# Patient Record
Sex: FEMALE | Race: WHITE
[De-identification: names, ages, dates, MRNs, and addresses within clinical notes are randomized per-mention and may not be internally consistent; named-entity substitution may affect disease eponyms.]

---

## 2018-05-25 ENCOUNTER — HOSPITAL ENCOUNTER (EMERGENCY)
Dept: HOSPITAL 68 - ERH | Age: 73
End: 2018-05-25
Payer: COMMERCIAL

## 2018-05-25 VITALS — DIASTOLIC BLOOD PRESSURE: 74 MMHG | SYSTOLIC BLOOD PRESSURE: 136 MMHG

## 2018-05-25 VITALS — BODY MASS INDEX: 29.83 KG/M2 | HEIGHT: 61 IN | WEIGHT: 158 LBS

## 2018-05-25 DIAGNOSIS — R11.2: Primary | ICD-10-CM

## 2018-05-25 LAB
ABSOLUTE GRANULOCYTE CT: 4.7 /CUMM (ref 1.4–6.5)
BASOPHILS # BLD: 0 /CUMM (ref 0–0.2)
BASOPHILS NFR BLD: 0.5 % (ref 0–2)
EOSINOPHIL # BLD: 0.1 /CUMM (ref 0–0.7)
EOSINOPHIL NFR BLD: 1.1 % (ref 0–5)
ERYTHROCYTE [DISTWIDTH] IN BLOOD BY AUTOMATED COUNT: 13.8 % (ref 11.5–14.5)
GRANULOCYTES NFR BLD: 71.1 % (ref 42.2–75.2)
HCT VFR BLD CALC: 43.9 % (ref 37–47)
LYMPHOCYTES # BLD: 1.4 /CUMM (ref 1.2–3.4)
MCH RBC QN AUTO: 27.1 PG (ref 27–31)
MCHC RBC AUTO-ENTMCNC: 33.5 G/DL (ref 33–37)
MCV RBC AUTO: 80.8 FL (ref 81–99)
MONOCYTES # BLD: 0.4 /CUMM (ref 0.1–0.6)
PLATELET # BLD: 306 /CUMM (ref 130–400)
PMV BLD AUTO: 8.4 FL (ref 7.4–10.4)
RED BLOOD CELL CT: 5.44 /CUMM (ref 4.2–5.4)
WBC # BLD AUTO: 6.7 /CUMM (ref 4.8–10.8)

## 2018-05-25 NOTE — ULTRASOUND REPORT
EXAMINATION:
ABDOMINAL ULTRASOUND LIMITED
 
CLINICAL INFORMATION:
Right upper quadrant pain.
 
COMPARISON:
None.
 
TECHNIQUE:
Real-time imaging of the right upper quadrant abdominal viscera.
 
FINDINGS:
PANCREAS: The visualized pancreatic head and body are normal in appearance.
The remainder of the pancreas is obscured from visualization by the overlying
bowel gas.
 
LIVER: The liver is of normal size and echogenicity without focal lesions nor
intrahepatic biliary ductal dilation.
 
GALLBLADDER: There is a calculus within the gallbladder lumen. There is no
wall thickening or pericholecystic fluid.
 
COMMON BILE DUCT: Normal in caliber measuring 0.5 cm in diameter.
 
RIGHT KIDNEY: Normal. No hydronephrosis. No renal calculi or focal
parenchymal lesions.  The kidney measures 9.2 cm in maximum dimension.
 
FREE FLUID: None.
 
IMPRESSION:
 
Cholelithiasis without evidence of cholecystitis.

## 2018-05-25 NOTE — ED GI/GU/ABDOMINAL COMPLAINT
History of Present Illness
 
General
Chief Complaint: General Adult
Stated Complaint: STOMACH PAIN,VOMITING,DIZZY
Source: patient
Exam Limitations: no limitations
 
Vital Signs & Intake/Output
Vital Signs & Intake/Output
 Vital Signs
 
 
Date Time Temp Pulse Resp B/P B/P Pulse O2 O2 Flow FiO2
 
     Mean Ox Delivery Rate 
 
 1724  69 18 144/78  98 Room Air Room Air 
 
 1723  69  144/78     
 
 1707  78 16 137/84  96 Room Air  
 
 
 
Allergies
Coded Allergies:
azithromycin (RASH 18)
morphine (HIVES 18)
erythromycin base (VOMITING 18)
 
Reconcile Medications
Azithromycin (Zithromax Z-Carrillo) 250 MG CAP   1 DP PO AD pneumonia
     2 the first day followed by 1 for days 2-5
Codeine Phosphate/Guaifenesi (Robafen AC Cough Syrup 10 MG/5 Ml-100 MG/5 Ml) 120
ML SYR   1-2 TSP PO Q6P PRN COUGH/COLD SYMPTOMS
Montelukast Sodium (Singulair) 10 MG TABLET   1 TAB PO DAILY ALLERGIES  (
Reported)
Topiramate (Topamax) 50 MG TABLET   50 MG PO DAILY MIGRAINES  (Reported)
Verapamil HCl (Verapamil ER) 180 MG TABLET.ER   1 TAB PO DAILY MIGRAINES  (
Reported)
 
Triage Nurses Notes Reviewed? yes
Pregnant? N
Is pt currently breastfeeding? No
Onset: Abrupt
Duration: day(s): (1), constant
Timing: recent history
Quality/Severity: vomiting
Severity Numbers: 6
Location: epigastric, right upper quadrant
Radiation: no radiation
No Modifying Factors: none
Associated Symptoms: abdominal pain (d), denies
HPI:
Pleasant 72-year-old female who presents to the emergency department for sudden 
onset of right upper quadrant pain and nausea and vomiting.  She has had no 
history of diarrhea recently but does feels the urge to go.  She tells had a 
prior history of gallstones in the past and has had similar symptoms.  She 
recently had a left hand surgery for trigger finger this past week.  She was 
given Percocet and feels that this may have caused some of her symptoms-last 
dose was yesterday. These symptoms came on after taking the first pill. no 
hemetemesis. no chest pain. no shortness of breath.   She has a history of 
ulcers in the past and takes medication for her stomach daily.  She describes 
her pain mostly in the right upper quadrant and right epigastric region.  It has
been constant. 
(Jadon Springer)
 
Past History
 
Travel History
Traveled to [place] past 21 day No
 
Medical History
Any Pertinent Medical History? see below for history
Neurological: migraine
Cardiovascular: hypertension (BORDERLINE)
Respiratory: asthma
Gastrointestinal: GERD
 
Surgical History
Surgical History: left hand surgery (trigger finger)
 
Psychosocial History
What is your primary language English
 
Family History
Hx Contributory? No
(Jadon Springer)
 
Review of Systems
 
Review of Systems
Constitutional:
Reports: see HPI. 
Comments
Review of systems: See HPI, All other systems negative
Constitutional: No fever no chills
HEENT:  no sore throat no congestionn
Cardiovascular: No chest pain , no palpitation
Skin:  no rashes, no change in skin
Respiratory: No dyspnea no cough no  sputum no  hemoptysis
GI: See HPI, no diarrhea, no bloating/constipation
: No dysuria No hematuria, no frequency
Muscle skeletal: No joint pain, no back pain, no neck pain,
Neurologic: , no headache
Heme/endocrine: No bruising 
Immunology: No lymphadenopathy
(Jadon Springer)
 
Physical Exam
 
Physical Exam
General Appearance: well developed/nourished, no apparent distress, alert, awake
Gastrointestinal: normal bowel sounds, soft, tenderness, right upper quadrant 
tenderness negative Branch's sign
Comments:
Well-developed well-nourished person in no acute distress
HEENT: Normal EENT exam; PERRL, EOMI, no nystagmus. HEAD is atraumatic. moist 
mucous membranes.  
Neck: Supple,  normal range of motion without pain or tenderness
Back: Nontender, no CVA tenderness. Full range of motion
Cardiovascular: Regular rate and rhythms no murmurs rubs 
Respiratory: Chest nontender.There were no bony deformities, no asymmetry. No 
respiratory distress.  Patient speaking in full complete sentences. Breath 
sounds clear to auscultation bilaterally: NO W/R/R
Abdomen: Soft, epigastric right upper quadrant tenderness to palpation negative 
Branch sign nondistended, no appreciable organomegaly. Normal bowel sounds. No 
rebound/guarding, No appreciable enlargement of the abdominal aorta, No ascites.
Extremity: No edema, full range of motion of extremities
Neuro: Alert oriented x3, motor sensory normal, There were no obvious focal 
neurologic abnormalities.
Skin: No appreciable rash on exposed skin, skin is warm and dry.  No jaundice
Psych: Mood and affect is normal, memory and judgment is normal.
 
Core Measures
ACS in differential dx? No
Sepsis Present: No
Sepsis Focused Exam Completed? No
(Андрей EDGAR,Jadon)
 
Progress
Differential Diagnosis: AMI, bowel obstruction, cholecystitis, gastritis, 
hepatitis, hernia, inflamm bowel dis, intrauterine pregnancy, pancreatitis, 
peptic ulcer, PUD/GERD, perforated viscous, SBO
Plan of Care:
 Orders
 
 
Procedure Date/time Status
 
MISTAKE 1707 Active
 
TROPONIN LEVEL 1707 Complete
 
LIPASE 1707 Complete
 
COMPREHENSIVE METABOLIC PANEL 1707 Complete
 
CBC WITHOUT DIFFERENTIAL 1707 Complete
 
EKG 1707 Active
 
 
 Laboratory Tests
 
 
 
18 172:
Anion Gap 13, Estimated GFR > 60, BUN/Creatinine Ratio 13.8, Glucose 97, Calcium
9.9, Total Bilirubin 0.6, AST 29, ALT 30, Alkaline Phosphatase 76, Troponin I < 
0.01, Total Protein 7.3, Albumin 4.2, Globulin 3.1, Albumin/Globulin Ratio 1.4, 
Lipase 59, CBC w Diff NO MAN DIFF REQ, RBC 5.44  H, MCV 80.8  L, MCH 27.1, MCHC 
33.5, RDW 13.8, MPV 8.4, Gran % 71.1, Lymphocytes % 21.7, Monocytes % 5.6, 
Eosinophils % 1.1, Basophils % 0.5, Absolute Granulocytes 4.7, Absolute 
Lymphocytes 1.4, Absolute Monocytes 0.4, Absolute Eosinophils 0.1, Absolute 
Basophils 0
labs ordered, iv fluids and zofran 4 mg iv ordered
 
 
 pt resting in nad at thsi time, iv fluids running, d/w her all of her labs 
to date, nausea improoved
 
 
 discussed with the patient and her  her ultrasound findings.  She is
tolerating by mouth challenge with ginger ale she feels better her nausea and 
pain to the right upper side is resolved.  Return precautions were discussed at 
plane she'll follow-up with her primary care physician advised bland diet clear 
liquids return precautions were discussed directly she will avoid taking the 
oxycodone any longer
Diagnostic Imaging:
Viewed by Me: Ultrasound.  Discussed w/RAD: Ultrasound. 
Radiology Impression: PATIENT: SARAH GALLEGO  MEDICAL RECORD NO: [id number] 
PRESENT AGE: 72  PATIENT ACCOUNT NO: [id number] : 47  LOCATION: [facility] 
ORDERING PHYSICIAN: Jadon EDGAR     SERVICE DATE: 18- EXAM TYPE: US
- US-LIMITED ABDOMEN EXAMINATION: ABDOMINAL ULTRASOUND LIMITED CLINICAL 
INFORMATION: Right upper quadrant pain. COMPARISON: None. TECHNIQUE: Real-time 
imaging of the right upper quadrant abdominal viscera. FINDINGS: PANCREAS: The 
visualized pancreatic head and body are normal in appearance. The remainder of 
the pancreas is obscured from visualization by the overlying bowel gas. LIVER: 
The liver is of normal size and echogenicity without focal lesions nor 
intrahepatic biliary ductal dilation. GALLBLADDER: There is a calculus within 
the gallbladder lumen. There is no wall thickening or pericholecystic fluid. 
COMMON BILE DUCT: Normal in caliber measuring 0.5 cm in diameter. RIGHT KIDNEY: 
Normal. No hydronephrosis. No renal calculi or focal parenchymal lesions.  The 
kidney measures 9.2 cm in maximum dimension. FREE FLUID: None. IMPRESSION: 
Cholelithiasis without evidence of cholecystitis. DICTATED BY: Lazaro Valenzuela MD 
DATE/TIME DICTATED:18 :RAD.GALLARDO  DATE/TIME 
TRANSCRIBED:18 CONFIDENTIAL, DO NOT COPY WITHOUT APPROPRIATE 
AUTHORIZATION.  <Electronically signed in Other Vendor System>                  
                                                                     SIGNED BY: 
Lazaro Valenzuela MD 18
Initial ED EKG: normal intervals, normal p-waves, normal QRS complex, normal 
sinus rhythm
(Jadon Springer)
 
Departure
 
Departure
Time of Disposition: 
Disposition: HOME OR SELF CARE
Condition: Stable
Clinical Impression
Primary Impression: Nausea & vomiting
Referrals:
Ryan SMITH,Jennie ALANIZ (PCP/Family)
 
Lisa SMITH,Jose KELLEY
 
Additional Instructions:
zofran for nausea. bland diet. clear liquids- advance as tolerated. follow up 
with your pmd next week. Follow up with general surgeon dr escalante regarding your 
gallstones. return to the ER with any concerns
Departure Forms:
Customer Survey
General Discharge Information
(Jadon Springer)
 
PA/NP Co-Sign Statement
Statement:
ED Attending supervision documentation-
 
[X] I saw and evaluated the patient. I have also reviewed all the pertinent lab 
results and diagnostic results. I agree with the findings and the plan of care 
as documented in the PA's/NP's documentation. 
 
[X] I have reviewed the ED Record and agree with the PA's/NP's documentation.
 
[] Additions or exceptions (if any) to the PAs/NP's note and plan are 
summarized below:
[]
 
(Vini SMITH,Jaspal BARRIOS)